# Patient Record
Sex: MALE | Race: WHITE | NOT HISPANIC OR LATINO | URBAN - METROPOLITAN AREA
[De-identification: names, ages, dates, MRNs, and addresses within clinical notes are randomized per-mention and may not be internally consistent; named-entity substitution may affect disease eponyms.]

---

## 2019-05-22 ENCOUNTER — OFFICE VISIT - HEALTHEAST (OUTPATIENT)
Dept: FAMILY MEDICINE | Facility: CLINIC | Age: 59
End: 2019-05-22

## 2019-05-22 DIAGNOSIS — M25.551 PAIN IN JOINT, PELVIC REGION AND THIGH, RIGHT: ICD-10-CM

## 2019-05-22 RX ORDER — LAMOTRIGINE 200 MG/1
200 TABLET ORAL
Status: SHIPPED | COMMUNITY
Start: 2019-04-22

## 2019-05-22 RX ORDER — VENLAFAXINE HYDROCHLORIDE 75 MG/1
CAPSULE, EXTENDED RELEASE ORAL
Status: SHIPPED | COMMUNITY
Start: 2019-04-22

## 2019-05-22 RX ORDER — RISPERIDONE 0.5 MG/1
0.5 TABLET ORAL
Status: SHIPPED | COMMUNITY
Start: 2019-04-22

## 2019-05-22 RX ORDER — TRAZODONE HYDROCHLORIDE 50 MG/1
100 TABLET, FILM COATED ORAL
Status: SHIPPED | COMMUNITY
Start: 2018-10-30

## 2019-05-22 RX ORDER — DIAZEPAM 5 MG
TABLET ORAL
Status: SHIPPED | COMMUNITY
Start: 2019-04-02

## 2019-05-22 ASSESSMENT — MIFFLIN-ST. JEOR: SCORE: 1766.08

## 2020-02-14 ENCOUNTER — COMMUNICATION - HEALTHEAST (OUTPATIENT)
Dept: UROLOGY | Facility: CLINIC | Age: 60
End: 2020-02-14

## 2021-05-27 ENCOUNTER — RECORDS - HEALTHEAST (OUTPATIENT)
Dept: ADMINISTRATIVE | Facility: CLINIC | Age: 61
End: 2021-05-27

## 2021-05-29 ENCOUNTER — RECORDS - HEALTHEAST (OUTPATIENT)
Dept: ADMINISTRATIVE | Facility: CLINIC | Age: 61
End: 2021-05-29

## 2021-05-29 NOTE — PROGRESS NOTES
"Chief Complaint   Patient presents with     Hospital Visit Follow Up     He states that he went to ER on Monday night. Originally had right testicle pain. That has subsided and now has right leg pain.     Leg Pain     Right leg pain. This started yesterday. Feels it from his hip to his knee. Constant pain. feels like a \"deep deep deep vicki horse\". Pain feels like a 10 constantly.      HPI: Patient presents today following up his evaluation in the emergency department on 5/21/2019.  He had awoken with severe right testicular pain which prompted his evaluation.  Ultrasound was done on the testicles which showed a small epididymis cyst, but no overtly abnormal findings.  CT of the abdomen and pelvis was done which showed diverticulosis and bilateral nonobstructing renal stones without hydronephrosis.    Labs included UA, lactic acid, lipase, hepatic function, inflammatory markers, metabolic panels, and blood counts.  All of those came back essentially normal.    He was discharged home with recommendations to take ibuprofen and follow-up with his PCP if symptoms fail to improve as anticipated.    Since discharge from the hospital, the patient notes that he is still having excruciating pain, but it is now evolved to where it is radiating down the anterior portion of his thigh.  He describes it as a severe aching sensation that will also sometimes radiate into the right testicle.  He did receive a prescription for Percocet and has taken 2 of those notes that there was mild-moderate improvement in pain.  He has not taken any today as he did not want to \"mask\" any of his symptoms.  He also was prescribed a Medrol Dosepak 2 days ago which he has not started.    No urinary symptoms. Afebrile, Denies saddle anesthesia.  No numbness or tingling down the right leg.  No bowel/bladder incontinence.  Able to ambulate.  Atraumatic.  He has been meeting with a chiropractor who said that his pain was related to a lumbar source.  He " "says that he has had imaging of his lower spine years ago, but cannot remember what it was exactly what the results were.    ROS:Review of Systems - History obtained from the patient  General ROS: negative  Hematological and Lymphatic ROS: negative  Respiratory ROS: negative  Cardiovascular ROS: negative  Gastrointestinal ROS: positive for - appetite loss  Genito-Urinary ROS: positive for - testicular pain  Musculoskeletal ROS: positive for - muscle pain  Neurological ROS: negative  Dermatological ROS: negative    SH: The Patient's  reports that he has quit smoking. He uses smokeless tobacco. He reports that he drank alcohol. He reports that he does not use drugs.      FH: The Patient's family history includes Abnormal EKG in his father; Breast cancer in his mother; Heart attack in his father; Pancreatic cancer in his mother; Parkinsonism in his father.     Meds:    Current Outpatient Medications on File Prior to Visit   Medication Sig Dispense Refill     diazePAM (VALIUM) 5 MG tablet Take by mouth.       lamoTRIgine (LAMICTAL) 200 MG tablet Take 200 mg by mouth.       risperiDONE (RISPERDAL) 0.5 MG tablet Take 0.5 mg by mouth.       traZODone (DESYREL) 50 MG tablet Take 100 mg by mouth.       venlafaxine (EFFEXOR-XR) 75 MG 24 hr capsule Take by mouth.       methylPREDNISolone (MEDROL, EVA,) 4 mg tablet Take by mouth as instructed per packaging.       No current facility-administered medications on file prior to visit.        O:  /90   Pulse 69   Temp 98.5  F (36.9  C) (Oral)   Ht 5' 11\" (1.803 m)   Wt 207 lb (93.9 kg)   SpO2 96%   BMI 28.87 kg/m      Physical Examination:   General appearance - alert, well appearing, and in no distress  Mental status - alert, oriented to person, place, and time  Mouth - mucous membranes moist, pharynx normal without lesions  Neck - supple, no significant adenopathy  Lymphatics - no palpable lymphadenopathy, no hepatosplenomegaly  Chest - clear to auscultation, no " wheezes, rales or rhonchi, symmetric air entry  Heart - normal rate and regular rhythm, S1 and S2 normal, no murmurs noted  Abdomen - soft, nontender, nondistended, no masses or organomegaly   Male - no penile lesions or discharge, no testicular masses or tenderness, no hernias, HERNIA EXAM: no hernias found on exam, TESTICULAR EXAM: normal, no masses, PENIS: normal without lesions or discharge, SCROTUM: normal, no masses  Neurological - alert, oriented, normal speech, no focal findings or movement disorder noted, neck supple without rigidity, cranial nerves II through XII intact, motor and sensory grossly normal bilaterally, normal muscle tone, no tremors, strength 5/5, DTRs intact  Musculoskeletal -no significant pain with palpation along the anterior thigh muscular system.  Flexion of the right hip causes a relief of pain in the thigh and testicle.  No pain with internal or external rotation.  Flexion of the left hip actually increases pain in the right testicle and thigh.  Extremities - peripheral pulses normal, no pedal edema, no clubbing or cyanosis  Skin - normal coloration and turgor, no rashes, no suspicious skin lesions noted      A/P:     Problem List Items Addressed This Visit     None      Visit Diagnoses     Pain in joint, pelvic region and thigh, right    -  Primary    Relevant Medications    oxyCODONE-acetaminophen (PERCOCET/ENDOCET) 5-325 mg per tablet    Other Relevant Orders    Ambulatory referral to Orthopedics            1. Pain in joint, pelvic region and thigh, right  Given the pain has now evolved to where it is traveling down the thigh and occasionally the testicle, I am more inclined to believe he may have a nerve root impingement.  Start with the Medrol Dosepak already prescribed to him.  Continue with as needed Percocet sparingly.  Advised no alcohol or driving afterwards.  Refer to orthopedics for further work-up for confirmation      - oxyCODONE-acetaminophen (PERCOCET/ENDOCET) 5-325  mg per tablet; Take 1 tablet by mouth every 6 (six) hours as needed for pain.  Dispense: 30 tablet; Refill: 0  - Ambulatory referral to Orthopedics        Wes Rivera, CNP

## 2021-05-29 NOTE — PATIENT INSTRUCTIONS - HE
I don't think the pain is related to the testicle itself, but rather an irritation of a nerve root. Start the medrol dose pack the other doctor gave and see if that helps. I also sent a temporary refill of the percocet. No alcohol or driving after taking this. Watch for lightheadedness, dizziness, and constipation.    Since the testicle ultrasound looks normal, but you're having that pain going down the thigh, let's start with orthopedics to dive deeper and see if they can identify the specific impingement point and come up with a treatment plan.    Thank you for coming in today!    If you receive a survey from [employer] about your experience today, it would be very helpful if you could fill it out to let us know what went well and what we can improve!    General Information:    Today you had your appointment with Wes Rivera NP    My hours are:    Monday : Out of clinic  Tuesday : 8:00AM - 5:00 PM  Wednesday: 8:00AM - 5:00 PM  Thursday: 8:00AM - 5:00 PM  Friday: 8:00AM - 5:00 PM    I am not in the office Mondays. Therefore non-urgent calls and medical messages received on Monday will be addressed when I am back in the office on Tuesday. Urgent matters will be reviewed and addressed by one of my partners in the office as needed.    If lab work was done today as part of your evaluation you will generally be contacted via [employer], mail, or phone with the results within 1-5 days. If there is an alarming result we will contact you by phone. Lab results come back at varying times, I generally wait until all lab results are available before making comments on the results.     If you need refills please contact your pharmacy. They will send a refill request to me to review. Please allow 3-5 business days for us to process all refill requests.     My Clinical Assistant is Steff. Please call us at [phone] or send a medical message with any questions or concerns.

## 2021-06-03 VITALS — BODY MASS INDEX: 28.98 KG/M2 | WEIGHT: 207 LBS | HEIGHT: 71 IN

## 2021-06-06 NOTE — TELEPHONE ENCOUNTER
Spoke with patient who is feeling well today.  He does not wish to schedule an appointment at this time.  Education given regarding medications and number to call with any questions or to schedule an appt.  Rachel Nielsen RN

## 2021-06-16 PROBLEM — K57.30 DIVERTICULOSIS OF LARGE INTESTINE: Status: ACTIVE | Noted: 2019-05-22

## 2025-06-03 NOTE — H&P (VIEW-ONLY)
PREOPERATIVE CLEARANCE  Date of Exam: 6/3/2025    Nursing Notes:   Masha Stroud CMA  6/3/2025  1:44 PM  Signed  Chief Complaint   Patient presents with     Preoperative Exam     DOS 6/24/25, Dr. Hopson, [facility] Maintenance Due   Topic Date Due     Pneumococcal series for age 50+ (1 of 2 - PCV) 08/04/1978     COVID-19 vaccine series (7 - 2024-25 season) 03/20/2025     Tetanus booster  05/07/2025       Roberto Ashton is a 64 y.o. male (1960) who presents for preop evaluation undergoing surgery for treatment of kidney stones, both kidneys.    Date of Surgery: 6/24/25  Surgical Specialty: Urology  Erick Hopson [facility] - [facility]  Surgery type: outpatient  Dr. Hopson FAX: [phone]  Primary Physician: Yenifer Quinteros CMA ....................  6/3/2025   1:35 PM       HPI:  Nephrolithiasis   No cp sob gi or neuro sx  Chronic depression currently stable     Problem List:   Patient Active Problem List   Diagnosis Code     Counseling for marital and partner problems, unspecified Z71.89, Z63.0     Lipids abnormal E78.89     Anxiety state, unspecified F41.1     Panic disorder without agoraphobia F41.0     Tobacco abuse Z72.0     Diverticulitis K57.92     Occupational circumstances Z56.9     Major depressive disorder, single episode, severe, without mention of psychotic behavior F32.2     Tinnitus of left ear H93.12     Sensorineural hearing loss, asymmetrical H90.3     Dysphagia R13.10     Eosinophilic esophagitis K20.0     Trauma disorder, cumulative X50.3XXA      Histories:  Past Medical History:   . Date     Anxiety      Depression      Diverticulitis      Eosinophilic esophagitis 4/30/2021     Tobacco abuse       Past Surgical History:   . Laterality Date     COLONOSCOPY SCREENING  11/15/2010    incomplete at [facility] d/t active diverticulitis;given abx;rep 6-8 wks     COLONOSCOPY SCREENING  10/17/2016     hemorrhoids;advanced adenoma - 2 polyps;tics;rep 1 yr per [facility]     ESOPHAGOGASTRODUODENOSCOPY  04/28/2021    +EoE bx;dil 14mm w/tear;gastritis;duodenitis;no HP or sprue     WISDOM TEETH EXTRACTION       Social History     Socioeconomic History     Marital status:    Tobacco Use     Smoking status: Every Day     Current packs/day: 0.50     Types: Cigarettes     Smokeless tobacco: Never     Tobacco comments:     vape   Vaping Use     Vaping status: Every Day     Substances: Nicotine   Substance and Sexual Activity     Alcohol use: No     Alcohol/week: 0.0 standard drinks of alcohol     Drug use: Yes     Types: Marijuana   Social History Narrative    Lives with wife in [place].     Family History   Problem Relation Age of Onset     Parkinsonism Father      Cancer-breast Mother      Cancer-pancreatic Mother       Allergies: Gatifloxacin [gatifloxacin] and Penicillins   Latex Allergy: no    Current Medications:  Current Outpatient Rx   Medication Sig Dispense Refill     ARIPiprazole 10 mg tablet Take 1 Tablet (10 mg) by mouth once daily. 30 Tablet 3     atorvastatin 20 mg tablet TAKE ONE TABLET BY MOUTH AT BEDTIME 90 Tablet 3     celecoxib 100 mg capsule Take 1 Capsule (100 mg) by mouth two times daily with meals. 30 Capsule 0     gabapentin 100 mg capsule Take 1 Capsule (100 mg) by mouth two times daily. Can stop after 2-4 weeks if no effect noticed. 90 Capsule 0     lamoTRIgine 200 mg tablet Take 1 Tablet (200 mg) by mouth once daily. 90 Tablet 1     oxyCODONE-acetaminophen (Percocet) 5-325 mg per tablet Take 1 Tablet by mouth every 4 hours if needed for Pain. Max acetaminophen dose: 4000mg in 24 hrs. 5 Tablet 0     sildenafiL (pulm.hypertension) 20 mg tablet TAKE 3-5 TABLETS BY MOUTH ONCE DAILY IF NEEDED BEFORE SEXUAL ACTIVITY 90 Tablet 5     tadalafiL (CIALIS) 5 mg tablet TAKE ONE TABLET BY MOUTH EVERY DAY 90 Tablet 3     tadalafiL (CIALIS;ADCIRCA) 20 mg tablet Take 1 Tablet (20 mg) by mouth once daily  "if needed for Erectile Dysfunction. Take 30 minutes before sexual activity. 10 Tablet 11     tamsulosin 0.4 mg capsule TAKE TWO CAPSULES BY MOUTH EVERY  Capsule 1     traZODone 100 mg tablet Take 1 Tablet (100 mg) by mouth at bedtime. 30 Tablet 3     venlafaxine 150 mg Extended-Release capsule Take 2 Capsules (300 mg) by mouth once daily with evening meal. 60 Capsule 3             HABITS:   Social History     Tobacco Use     Smoking status: Every Day     Current packs/day: 0.50     Types: Cigarettes     Smokeless tobacco: Never     Tobacco comments:     vape   Substance Use Topics     Alcohol use: No     Alcohol/week: 0.0 standard drinks of alcohol   Tetanus up to date yes        Health Care Directive or Living Will: no    REVIEW OF SYSTEMS:  see HPI    EXAM:   /64 (Cuff Site: Right Arm, Position: Sitting, Cuff Size: Adult Regular)   Pulse 72   Ht 1.753 m (5' 9\")   Wt 69.9 kg (154 lb)   SpO2 97%   BMI 22.74 kg/m   Body mass index is 22.74 kg/m .  HEENT   neg   NECK    supple  LN'S    no cervical or supraclavicular lymphadenopathy  ENDO    no thyromegaly  PULM    clear   CV      Regular rate no m or g  GI      abd soft no r or g  NEURO   grossly non focal  SKIN    no obvious or diffuse rash  JOINTS  no obvious deformities  EXT     no edema      DIAGNOSTICS:       3. Labs: see attached    IMPRESSION:     ICD-10-CM    1. Preop examination  Z01.818 CBC AND DIFFERENTIAL     BASIC METABOLIC PANEL      2. Back pain without radiation  M54.9 celecoxib 100 mg capsule     oxyCODONE-acetaminophen (Percocet) 5-325 mg per tablet      3. Nephrolithiasis  N20.0           For above listed surgery and anesthesia:   Patient is low risk for perioperative complications.    RECOMMENDATIONS: proceed without further diagnostic evaluation    Electronically Signed by: Yenifer Quinteros MD          "

## 2025-06-20 RX ORDER — ATORVASTATIN CALCIUM 20 MG/1
20 TABLET, FILM COATED ORAL DAILY
COMMUNITY

## 2025-06-20 RX ORDER — ARIPIPRAZOLE 10 MG/1
10 TABLET ORAL DAILY
COMMUNITY

## 2025-06-20 RX ORDER — TAMSULOSIN HYDROCHLORIDE 0.4 MG/1
0.8 CAPSULE ORAL DAILY
COMMUNITY

## 2025-06-20 RX ORDER — CELECOXIB 100 MG/1
100 CAPSULE ORAL 2 TIMES DAILY
COMMUNITY

## 2025-06-20 RX ORDER — TADALAFIL 20 MG/1
20 TABLET ORAL EVERY 24 HOURS
COMMUNITY

## 2025-06-20 RX ORDER — GABAPENTIN 100 MG/1
100 CAPSULE ORAL 2 TIMES DAILY
COMMUNITY

## 2025-06-20 RX ORDER — OXYCODONE AND ACETAMINOPHEN 5; 325 MG/1; MG/1
1 TABLET ORAL EVERY 4 HOURS PRN
COMMUNITY

## 2025-06-20 RX ORDER — TADALAFIL 5 MG/1
5 TABLET ORAL EVERY 24 HOURS
COMMUNITY

## 2025-06-23 ENCOUNTER — ANESTHESIA EVENT (OUTPATIENT)
Dept: SURGERY | Facility: HOSPITAL | Age: 65
End: 2025-06-23
Payer: COMMERCIAL

## 2025-06-24 ENCOUNTER — ANESTHESIA (OUTPATIENT)
Dept: SURGERY | Facility: HOSPITAL | Age: 65
End: 2025-06-24
Payer: COMMERCIAL

## 2025-06-24 ENCOUNTER — APPOINTMENT (OUTPATIENT)
Dept: RADIOLOGY | Facility: HOSPITAL | Age: 65
End: 2025-06-24
Attending: UROLOGY
Payer: COMMERCIAL

## 2025-06-24 ENCOUNTER — HOSPITAL ENCOUNTER (OUTPATIENT)
Facility: HOSPITAL | Age: 65
Discharge: HOME | End: 2025-06-24
Attending: UROLOGY | Admitting: UROLOGY
Payer: COMMERCIAL

## 2025-06-24 VITALS
BODY MASS INDEX: 23.16 KG/M2 | RESPIRATION RATE: 16 BRPM | DIASTOLIC BLOOD PRESSURE: 73 MMHG | WEIGHT: 156.8 LBS | SYSTOLIC BLOOD PRESSURE: 136 MMHG | TEMPERATURE: 98.3 F | OXYGEN SATURATION: 99 % | HEART RATE: 65 BPM

## 2025-06-24 DIAGNOSIS — N20.0 KIDNEY STONE: Primary | ICD-10-CM

## 2025-06-24 PROCEDURE — C2617 STENT, NON-COR, TEM W/O DEL: HCPCS | Performed by: UROLOGY

## 2025-06-24 PROCEDURE — 250N000011 HC RX IP 250 OP 636: Performed by: NURSE ANESTHETIST, CERTIFIED REGISTERED

## 2025-06-24 PROCEDURE — 360N000084 HC SURGERY LEVEL 4 W/ FLUORO, PER MIN: Performed by: UROLOGY

## 2025-06-24 PROCEDURE — 710N000009 HC RECOVERY PHASE 1, LEVEL 1, PER MIN: Performed by: UROLOGY

## 2025-06-24 PROCEDURE — 258N000003 HC RX IP 258 OP 636: Performed by: NURSE ANESTHETIST, CERTIFIED REGISTERED

## 2025-06-24 PROCEDURE — P9045 ALBUMIN (HUMAN), 5%, 250 ML: HCPCS | Mod: JZ | Performed by: NURSE ANESTHETIST, CERTIFIED REGISTERED

## 2025-06-24 PROCEDURE — 258N000001 HC RX 258: Performed by: UROLOGY

## 2025-06-24 PROCEDURE — 250N000009 HC RX 250: Performed by: ANESTHESIOLOGY

## 2025-06-24 PROCEDURE — 999N000141 HC STATISTIC PRE-PROCEDURE NURSING ASSESSMENT: Performed by: UROLOGY

## 2025-06-24 PROCEDURE — 250N000011 HC RX IP 250 OP 636: Performed by: NURSE PRACTITIONER

## 2025-06-24 PROCEDURE — 250N000025 HC SEVOFLURANE, PER MIN: Performed by: UROLOGY

## 2025-06-24 PROCEDURE — 999N000182 XR SURGERY CARM FLUORO GREATER THAN 5 MIN

## 2025-06-24 PROCEDURE — C1769 GUIDE WIRE: HCPCS | Performed by: UROLOGY

## 2025-06-24 PROCEDURE — 272N000002 HC OR SUPPLY OTHER OPNP: Performed by: UROLOGY

## 2025-06-24 PROCEDURE — 710N000012 HC RECOVERY PHASE 2, PER MINUTE: Performed by: UROLOGY

## 2025-06-24 PROCEDURE — 272N000001 HC OR GENERAL SUPPLY STERILE: Performed by: UROLOGY

## 2025-06-24 PROCEDURE — C1894 INTRO/SHEATH, NON-LASER: HCPCS | Performed by: UROLOGY

## 2025-06-24 PROCEDURE — 250N000013 HC RX MED GY IP 250 OP 250 PS 637: Performed by: NURSE PRACTITIONER

## 2025-06-24 PROCEDURE — 250N000009 HC RX 250: Performed by: NURSE ANESTHETIST, CERTIFIED REGISTERED

## 2025-06-24 PROCEDURE — 250N000011 HC RX IP 250 OP 636: Performed by: ANESTHESIOLOGY

## 2025-06-24 PROCEDURE — 258N000003 HC RX IP 258 OP 636: Performed by: ANESTHESIOLOGY

## 2025-06-24 PROCEDURE — 82365 CALCULUS SPECTROSCOPY: CPT | Performed by: UROLOGY

## 2025-06-24 PROCEDURE — 370N000017 HC ANESTHESIA TECHNICAL FEE, PER MIN: Performed by: UROLOGY

## 2025-06-24 PROCEDURE — 255N000002 HC RX 255 OP 636: Performed by: UROLOGY

## 2025-06-24 DEVICE — URETERAL STENT
Type: IMPLANTABLE DEVICE | Site: URETER | Status: FUNCTIONAL
Brand: PERCUFLEX™ PLUS

## 2025-06-24 RX ORDER — LIDOCAINE 40 MG/G
CREAM TOPICAL
Status: DISCONTINUED | OUTPATIENT
Start: 2025-06-24 | End: 2025-06-24 | Stop reason: HOSPADM

## 2025-06-24 RX ORDER — OXYCODONE HYDROCHLORIDE 5 MG/1
5 TABLET ORAL
Status: DISCONTINUED | OUTPATIENT
Start: 2025-06-24 | End: 2025-06-24 | Stop reason: HOSPADM

## 2025-06-24 RX ORDER — CEFAZOLIN SODIUM/WATER 2 G/20 ML
2 SYRINGE (ML) INTRAVENOUS
Status: COMPLETED | OUTPATIENT
Start: 2025-06-24 | End: 2025-06-24

## 2025-06-24 RX ORDER — FENTANYL CITRATE 50 UG/ML
25 INJECTION, SOLUTION INTRAMUSCULAR; INTRAVENOUS EVERY 5 MIN PRN
Status: DISCONTINUED | OUTPATIENT
Start: 2025-06-24 | End: 2025-06-24 | Stop reason: HOSPADM

## 2025-06-24 RX ORDER — SODIUM CHLORIDE, SODIUM LACTATE, POTASSIUM CHLORIDE, CALCIUM CHLORIDE 600; 310; 30; 20 MG/100ML; MG/100ML; MG/100ML; MG/100ML
INJECTION, SOLUTION INTRAVENOUS CONTINUOUS
Status: DISCONTINUED | OUTPATIENT
Start: 2025-06-24 | End: 2025-06-24 | Stop reason: HOSPADM

## 2025-06-24 RX ORDER — DEXAMETHASONE SODIUM PHOSPHATE 4 MG/ML
4 INJECTION, SOLUTION INTRA-ARTICULAR; INTRALESIONAL; INTRAMUSCULAR; INTRAVENOUS; SOFT TISSUE
Status: DISCONTINUED | OUTPATIENT
Start: 2025-06-24 | End: 2025-06-24 | Stop reason: HOSPADM

## 2025-06-24 RX ORDER — ONDANSETRON 2 MG/ML
INJECTION INTRAMUSCULAR; INTRAVENOUS PRN
Status: DISCONTINUED | OUTPATIENT
Start: 2025-06-24 | End: 2025-06-24

## 2025-06-24 RX ORDER — HYDROCODONE BITARTRATE AND ACETAMINOPHEN 5; 325 MG/1; MG/1
1-2 TABLET ORAL EVERY 4 HOURS PRN
Qty: 10 TABLET | Refills: 0 | Status: SHIPPED | OUTPATIENT
Start: 2025-06-24

## 2025-06-24 RX ORDER — NALOXONE HYDROCHLORIDE 1 MG/ML
0.1 INJECTION INTRAMUSCULAR; INTRAVENOUS; SUBCUTANEOUS
Status: DISCONTINUED | OUTPATIENT
Start: 2025-06-24 | End: 2025-06-24 | Stop reason: HOSPADM

## 2025-06-24 RX ORDER — OXYCODONE HYDROCHLORIDE 5 MG/1
10 TABLET ORAL
Status: DISCONTINUED | OUTPATIENT
Start: 2025-06-24 | End: 2025-06-24 | Stop reason: HOSPADM

## 2025-06-24 RX ORDER — OXYBUTYNIN CHLORIDE 5 MG/1
5 TABLET ORAL 3 TIMES DAILY PRN
Qty: 30 TABLET | Refills: 1 | Status: SHIPPED | OUTPATIENT
Start: 2025-06-24

## 2025-06-24 RX ORDER — FENTANYL CITRATE 50 UG/ML
INJECTION, SOLUTION INTRAMUSCULAR; INTRAVENOUS PRN
Status: DISCONTINUED | OUTPATIENT
Start: 2025-06-24 | End: 2025-06-24

## 2025-06-24 RX ORDER — DEXAMETHASONE SODIUM PHOSPHATE 10 MG/ML
INJECTION, SOLUTION INTRAMUSCULAR; INTRAVENOUS PRN
Status: DISCONTINUED | OUTPATIENT
Start: 2025-06-24 | End: 2025-06-24

## 2025-06-24 RX ORDER — CEFAZOLIN SODIUM/WATER 2 G/20 ML
2 SYRINGE (ML) INTRAVENOUS SEE ADMIN INSTRUCTIONS
Status: DISCONTINUED | OUTPATIENT
Start: 2025-06-24 | End: 2025-06-24 | Stop reason: HOSPADM

## 2025-06-24 RX ORDER — ONDANSETRON 4 MG/1
4 TABLET, ORALLY DISINTEGRATING ORAL EVERY 30 MIN PRN
Status: DISCONTINUED | OUTPATIENT
Start: 2025-06-24 | End: 2025-06-24 | Stop reason: HOSPADM

## 2025-06-24 RX ORDER — PROPOFOL 10 MG/ML
INJECTION, EMULSION INTRAVENOUS PRN
Status: DISCONTINUED | OUTPATIENT
Start: 2025-06-24 | End: 2025-06-24

## 2025-06-24 RX ORDER — ACETAMINOPHEN 650 MG/1
650 SUPPOSITORY RECTAL ONCE
Status: COMPLETED | OUTPATIENT
Start: 2025-06-24 | End: 2025-06-24

## 2025-06-24 RX ORDER — ONDANSETRON 2 MG/ML
4 INJECTION INTRAMUSCULAR; INTRAVENOUS EVERY 30 MIN PRN
Status: DISCONTINUED | OUTPATIENT
Start: 2025-06-24 | End: 2025-06-24 | Stop reason: HOSPADM

## 2025-06-24 RX ORDER — HYDROCODONE BITARTRATE AND ACETAMINOPHEN 5; 325 MG/1; MG/1
1 TABLET ORAL EVERY 4 HOURS PRN
Status: DISCONTINUED | OUTPATIENT
Start: 2025-06-24 | End: 2025-06-24 | Stop reason: HOSPADM

## 2025-06-24 RX ORDER — ACETAMINOPHEN 325 MG/1
650 TABLET ORAL EVERY 4 HOURS PRN
Status: DISCONTINUED | OUTPATIENT
Start: 2025-06-24 | End: 2025-06-24 | Stop reason: HOSPADM

## 2025-06-24 RX ORDER — ACETAMINOPHEN 325 MG/1
975 TABLET ORAL ONCE
Status: COMPLETED | OUTPATIENT
Start: 2025-06-24 | End: 2025-06-24

## 2025-06-24 RX ORDER — FENTANYL CITRATE 50 UG/ML
50 INJECTION, SOLUTION INTRAMUSCULAR; INTRAVENOUS EVERY 5 MIN PRN
Status: DISCONTINUED | OUTPATIENT
Start: 2025-06-24 | End: 2025-06-24 | Stop reason: HOSPADM

## 2025-06-24 RX ORDER — EPHEDRINE SULFATE 50 MG/ML
INJECTION, SOLUTION INTRAMUSCULAR; INTRAVENOUS; SUBCUTANEOUS PRN
Status: DISCONTINUED | OUTPATIENT
Start: 2025-06-24 | End: 2025-06-24

## 2025-06-24 RX ADMIN — SODIUM CHLORIDE 20 MCG: 9 INJECTION, SOLUTION INTRAVENOUS at 09:53

## 2025-06-24 RX ADMIN — PHENYLEPHRINE HYDROCHLORIDE 100 MCG: 10 INJECTION INTRAVENOUS at 07:40

## 2025-06-24 RX ADMIN — ACETAMINOPHEN 975 MG: 325 TABLET ORAL at 06:40

## 2025-06-24 RX ADMIN — ROCURONIUM 50 MG: 50 INJECTION, SOLUTION INTRAVENOUS at 07:22

## 2025-06-24 RX ADMIN — ALBUMIN HUMAN: 0.05 INJECTION, SOLUTION INTRAVENOUS at 07:45

## 2025-06-24 RX ADMIN — PHENYLEPHRINE HYDROCHLORIDE 150 MCG: 10 INJECTION INTRAVENOUS at 07:24

## 2025-06-24 RX ADMIN — PROPOFOL 100 MG: 10 INJECTION, EMULSION INTRAVENOUS at 08:07

## 2025-06-24 RX ADMIN — PHENYLEPHRINE HYDROCHLORIDE 100 MCG: 10 INJECTION INTRAVENOUS at 07:43

## 2025-06-24 RX ADMIN — ROCURONIUM 10 MG: 50 INJECTION, SOLUTION INTRAVENOUS at 09:33

## 2025-06-24 RX ADMIN — PHENYLEPHRINE HYDROCHLORIDE 100 MCG: 10 INJECTION INTRAVENOUS at 08:06

## 2025-06-24 RX ADMIN — Medication 10 MG: at 08:12

## 2025-06-24 RX ADMIN — SODIUM CHLORIDE 20 MCG: 9 INJECTION, SOLUTION INTRAVENOUS at 08:41

## 2025-06-24 RX ADMIN — ROCURONIUM 10 MG: 50 INJECTION, SOLUTION INTRAVENOUS at 09:10

## 2025-06-24 RX ADMIN — PHENYLEPHRINE HYDROCHLORIDE 100 MCG: 10 INJECTION INTRAVENOUS at 07:42

## 2025-06-24 RX ADMIN — FENTANYL CITRATE 25 MCG: 50 INJECTION, SOLUTION INTRAMUSCULAR; INTRAVENOUS at 10:39

## 2025-06-24 RX ADMIN — PHENYLEPHRINE HYDROCHLORIDE 100 MCG: 10 INJECTION INTRAVENOUS at 07:55

## 2025-06-24 RX ADMIN — PHENYLEPHRINE HYDROCHLORIDE 100 MCG: 10 INJECTION INTRAVENOUS at 08:07

## 2025-06-24 RX ADMIN — LIDOCAINE HYDROCHLORIDE 50 MG: 10 INJECTION, SOLUTION INFILTRATION; PERINEURAL at 07:21

## 2025-06-24 RX ADMIN — FENTANYL CITRATE 100 MCG: 50 INJECTION, SOLUTION INTRAMUSCULAR; INTRAVENOUS at 07:20

## 2025-06-24 RX ADMIN — PROPOFOL 130 MG: 10 INJECTION, EMULSION INTRAVENOUS at 08:06

## 2025-06-24 RX ADMIN — ONDANSETRON 4 MG: 2 INJECTION INTRAMUSCULAR; INTRAVENOUS at 09:46

## 2025-06-24 RX ADMIN — DEXAMETHASONE SODIUM PHOSPHATE 10 MG: 10 INJECTION, SOLUTION INTRAMUSCULAR; INTRAVENOUS at 07:31

## 2025-06-24 RX ADMIN — ROCURONIUM 10 MG: 50 INJECTION, SOLUTION INTRAVENOUS at 08:32

## 2025-06-24 RX ADMIN — Medication 2 G: at 07:11

## 2025-06-24 RX ADMIN — SODIUM CHLORIDE, SODIUM LACTATE, POTASSIUM CHLORIDE, AND CALCIUM CHLORIDE: .6; .31; .03; .02 INJECTION, SOLUTION INTRAVENOUS at 06:10

## 2025-06-24 RX ADMIN — PHENYLEPHRINE HYDROCHLORIDE 100 MCG: 10 INJECTION INTRAVENOUS at 07:46

## 2025-06-24 RX ADMIN — Medication 5 MG: at 08:22

## 2025-06-24 RX ADMIN — PHENYLEPHRINE HYDROCHLORIDE 0.3 MCG/KG/MIN: 10 INJECTION INTRAVENOUS at 08:07

## 2025-06-24 RX ADMIN — PHENYLEPHRINE HYDROCHLORIDE 200 MCG: 10 INJECTION INTRAVENOUS at 08:10

## 2025-06-24 RX ADMIN — MIDAZOLAM HYDROCHLORIDE 2 MG: 1 INJECTION, SOLUTION INTRAMUSCULAR; INTRAVENOUS at 07:11

## 2025-06-24 RX ADMIN — Medication 5 MG: at 08:27

## 2025-06-24 RX ADMIN — SUGAMMADEX 280 MG: 100 INJECTION, SOLUTION INTRAVENOUS at 09:49

## 2025-06-24 RX ADMIN — Medication 5 MG: at 08:25

## 2025-06-24 RX ADMIN — FENTANYL CITRATE 25 MCG: 50 INJECTION, SOLUTION INTRAMUSCULAR; INTRAVENOUS at 10:25

## 2025-06-24 RX ADMIN — PROPOFOL 170 MG: 10 INJECTION, EMULSION INTRAVENOUS at 07:21

## 2025-06-24 RX ADMIN — SODIUM CHLORIDE, SODIUM LACTATE, POTASSIUM CHLORIDE, AND CALCIUM CHLORIDE: .6; .31; .03; .02 INJECTION, SOLUTION INTRAVENOUS at 07:58

## 2025-06-24 ASSESSMENT — ACTIVITIES OF DAILY LIVING (ADL)
ADLS_ACUITY_SCORE: 18
ADLS_ACUITY_SCORE: 22
ADLS_ACUITY_SCORE: 18
ADLS_ACUITY_SCORE: 18
ADLS_ACUITY_SCORE: 22
ADLS_ACUITY_SCORE: 18
ADLS_ACUITY_SCORE: 18

## 2025-06-24 NOTE — OP NOTE
Location: [facility]     Patient Name: Roberto Ashton  Patient : 1960  Patient MRN: [id number]  Patient CSN: [id number]  Patient PCP: Yenifer Quinteros    Date of Service: 25     Pre-operative diagnosis: Bilateral kidney stones    Post-operative diagnosis: Bilateral kidney stones    Procedure:  Cystoscopy, Bilateral retrograde pyelogram, Bilateral ureteroscopy with laser lithotripsy, Bilateral 6 Fr x 28 cm ureteral stent placement    Surgeon: Dr. Erick Hopson    EBL: 10 mL    Anesthesia: General    Indication: 64 year old male who saw me for BPH concerns and had a renal ultrasound suggestive of a left kidney stone. CT imaging showed bilateral kidney stones (4 in each side; 3 mm right upper pole, 4/5/6 mm right lower pole stones, 3 mm left upper pole stone, 3 mm left mid renal stone, 7/4 mm left lower pole stones).  Options were discussed for stone management and patient elected to have them removed.  Risks, benefits, and alternatives to treatment were discussed with the patient and the patient elected to proceed.    Findings: His urethra was normal. Prostate was obstructive with bilobar hyperplasia. He had an elevated bladder neck. Bladder trabeculation was mild to moderate. There were bilateral ureteroceles with an os the size of the sensor wire.  On the left side, there was a diverticulum superior and lateral to the left ureterocele.  There was not hydronephrosis of either kidney.  On the left side, the only stones seen were in the lower pole and they were under the surface.  I lasered them free and removed 2 stones (~4 mm and 2 mm).  On the right side, there was a 3 mm upper pole stone that I lasered from the submucosa.  In the right lower pole, there was a cluster of stones that were submucosal as well.  Removing these stones took considerable time as reaching them with the ureteroscope was difficult due to the anatomy. A lot of secondary deflection was required.  However, I was able to clear out  this cluster of stones and removed a 5 mm, 4 mm, and 2 mm stone. No other right kidney stones were seen.  Bilateral ureteral stents were in good position.    Specimens: Left kidney stones, right kidney stones    Procedure:  After written informed consent was obtained the patient was brought into the operating room.  The patient was properly identified prior to the procedure, received preprocedure antibiotics, and had sequential compression devices on the legs.  The patient was then induced under anesthesia.    The patient was placed in dorsal lithotomy position and prepped and draped in the usual sterile fashion.  Cystoscopy was performed with the above findings.    The left diverticulum was cannulated with the 5 Fr open-ended ureteral catheter as it initially appeared to be the ureteral orifice.  Contrast was injected and it was found to be a diverticulum.      An angle tip sensor wire was passed through the left ureterocele and into the distal ureter.  A 5 Fr open-ended ureteral catheter was advanced into the distal ureter and the wire was removed.  A Left retrograde pyelogram was performed with the above findings.    A straight sensor wire was placed into the Left kidney. The 8/10 Fr coaxial dilators were then used to place a Super Stiff wire into the Left kidney.  Over the Super Stiff wire and under fluoroscopic guidance, a 11/13 Fr x 36 cm [employer] ureteral access sheath was placed. This was sutured in place using 2-0 Silk.  Flexible ureteroscopy was performed as above. Using the 200 nm laser fiber and the holmium laser, the stones in the lower pole were freed from the submucosal space.  I tried to remove the stones with the zero tip basket but the basket was not flexible enough to reach the stones.  The stones were removed easily with the [place] Halo basket. The access sheath was removed under direct vision.  The straight sensor wire was exchanged for the Super Stiff wire. A 6 Fr x 28 cm  ureteral stent was then deployed over the Super Stiff wire. There was a good coil in the kidney and a good coil in the bladder.  I verified the distal coil via cystoscopy.    An angle tip sensor wire was passed through the right ureterocele and into the distal ureter.  A 5 Fr open-ended ureteral catheter was advanced into the distal ureter and the wire was removed.  A Right retrograde pyelogram was performed with the above findings.    A straight sensor wire was placed into the Right kidney. The 8/10 Fr coaxial dilators were then used to place a Super Stiff wire into the Right kidney.  Over the Super Stiff wire and under fluoroscopic guidance, a 11/13 Fr x 36 cm [employer] ureteral access sheath was placed. This was sutured in place using 2-0 Silk.  Flexible ureteroscopy was performed as above. The upper pole stone and lower pole stones were freed from the submucosal space using the 200 nm laser fiber and holmium laser.  The [place] Halo basket was used to remove stones as above. Treatment of the lower pole took at least 30 minutes given the difficult anatomy, stone location, and need for secondary deflection. The access sheath was removed under direct vision.  The straight sensor wire was exchanged for the Super Stiff wire. A 6 Fr x 28 cm ureteral stent was then deployed over the Super Stiff wire. There was a good coil in the kidney and a good coil in the bladder.  I verified the distal coil via cystoscopy. I emptied the bladder and removed the scope. At this point, the procedure was completed.  He tolerated the procedure well.    Plan: Home. Stent removal in office on 7/2/25.  Remaining stones seen on CT scan are deeper submucosal or intraparenchymal at this time.    Erick Hopson MD  9:51 AM

## 2025-06-24 NOTE — ANESTHESIA PREPROCEDURE EVALUATION
Anesthesia Pre-Procedure Evaluation    Patient: Roberto Ashton   MRN: [id number] : 1960          Procedure : Procedure(s):  CYSTOSCOPY, BILATERAL RETROGRADE PYELOGRAM, BILATERAL URETEROSCOPY WITH LASER LITHOTRIPSY, BILATERAL URETERAL STENT PLACEMENT         Past Medical History:   Diagnosis Date    Anxiety state 2013    Diverticulosis of large intestine 2019    Dysphagia     Lipids abnormal 2012    Major depressive disorder, single episode, severe (H) 2014    Nephrolithiasis     Panic disorder without agoraphobia 2013      Past Surgical History:   Procedure Laterality Date    COLONOSCOPY      GI SURGERY      EGD    WISDOM TOOTH EXTRACTION        Allergies   Allergen Reactions    Penicillins Rash     Rash all over     Gatifloxacin Rash      Social History     Tobacco Use    Smoking status: Every Day     Types: Cigarettes    Smokeless tobacco: Current    Tobacco comments:     Vapes, 1/2 a cartridge per day, smokes 1/2 pack cigarettes per day   Substance Use Topics    Alcohol use: Not Currently      Wt Readings from Last 1 Encounters:   25 71.1 kg (156 lb 12.8 oz)        Anesthesia Evaluation   Pt has had prior anesthetic.         ROS/MED HX  ENT/Pulmonary:       Neurologic:       Cardiovascular:       METS/Exercise Tolerance:     Hematologic:       Musculoskeletal:       GI/Hepatic:       Renal/Genitourinary:     (+) renal disease,             Endo:       Psychiatric/Substance Use:     (+) psychiatric history anxiety and depression       Infectious Disease:       Malignancy:       Other:              Physical Exam  Airway  Mallampati: III  TM distance: >3 FB  Neck ROM: full  Mouth opening: < 4 cm    Cardiovascular   Rhythm: regular  Rate: normal rate     Dental   (+) Completely normal teeth      Pulmonary Breath sounds clear to auscultation        Neurological   He appears awake, alert and oriented x3.    Other Findings       OUTSIDE LABS:  CBC:   Lab Results   Component Value  "Date    WBC 7.5 02/14/2020    WBC 8.4 05/21/2019    HGB 15.3 02/14/2020    HGB 14.8 05/21/2019    HCT 44.4 02/14/2020    HCT 42.4 05/21/2019     02/14/2020     05/21/2019     BMP:   Lab Results   Component Value Date     02/14/2020     05/21/2019    POTASSIUM 4.1 02/14/2020    POTASSIUM 3.7 05/21/2019    CHLORIDE 105 02/14/2020    CHLORIDE 105 05/21/2019    CO2 26 02/14/2020    CO2 25 05/21/2019    BUN 16 02/14/2020    BUN 18 05/21/2019    CR 1.05 02/14/2020    CR 0.91 05/21/2019     02/14/2020     (H) 05/21/2019     COAGS: No results found for: \"PTT\", \"INR\", \"FIBR\"  POC: No results found for: \"BGM\", \"HCG\", \"HCGS\"  HEPATIC:   Lab Results   Component Value Date    ALBUMIN 3.8 05/21/2019    PROTTOTAL 6.7 05/21/2019    ALT 19 05/21/2019    AST 16 05/21/2019    ALKPHOS 73 05/21/2019    BILITOTAL 0.3 05/21/2019     OTHER:   Lab Results   Component Value Date    LACT 1.2 05/21/2019    SHAY 9.8 02/14/2020    LIPASE 28 05/21/2019    CRP 0.2 02/14/2020       Anesthesia Plan    ASA Status:  2      NPO Status: NPO Appropriate   Anesthesia Type: General.  Airway: oral.  Induction: intravenous.  Maintenance: Inhalation.   Techniques and Equipment:       - Monitoring Plan: standard ASA monitoring     Consents    Anesthesia Plan(s) and associated risks, benefits, and realistic alternatives discussed. Questions answered and patient/representative(s) expressed understanding.     - Discussed: CRNA     - Discussed with:  Patient               Postoperative Care    Pain management: non-narcotic analgesics, plan for postoperative opioid use.     Comments:    Other Comments: RBA GETA discussed including but not limited to dental damage, sore throat/hoarseness, N/V/Aspiration, MI/CVA/death/disability, positioning injuries, blood loss requiring transfusion                 Eneida Maya MD    I have reviewed the pertinent notes and labs in the chart from the past 30 days and (re)examined the " patient.  Any updates or changes from those notes are reflected in this note.    Clinically Significant Risk Factors Present on Admission

## 2025-06-24 NOTE — ANESTHESIA PROCEDURE NOTES
Airway       Patient location during procedure: OR       Procedure Start/Stop Times: 6/24/2025 7:25 AM  Staff -        CRNA: Payal Mclaughlin APRN CRNA       Performed By: CRNA  Consent for Airway        Urgency: elective  Indications and Patient Condition       Indications for airway management: jeanna-procedural       Induction type:intravenous       Mask difficulty assessment: 2 - vent by mask + OA or adjuvant +/- NMBA    Final Airway Details       Final airway type: endotracheal airway       Successful airway: ETT - single and Oral  Endotracheal Airway Details        ETT size (mm): 7.5       Cuffed: yes       Cuff volume (mL): 5       Successful intubation technique: direct laryngoscopy       DL Blade Type: Torre 2       Grade View of Cords: 1       Adjucts: stylet       Position: Right       Measured from: gums/teeth       Secured at (cm): 23       Bite block used: None    Post intubation assessment        Placement verified by: capnometry, equal breath sounds and chest rise        Number of attempts at approach: 1       Number of other approaches attempted: 0       Secured with: tape       Ease of procedure: easy       Dentition: Intact and Unchanged       Dental guard used and removed. Dental Guard Type: Standard White.    Medication(s) Administered   Medication Administration Time: 6/24/2025 7:25 AM

## 2025-06-24 NOTE — ANESTHESIA CARE TRANSFER NOTE
Patient: Roberto Ashton    Procedure: Procedure(s):  CYSTOSCOPY, BILATERAL RETROGRADE PYELOGRAM, BILATERAL URETEROSCOPY WITH LASER LITHOTRIPSY, BILATERAL URETERAL STENT PLACEMENT       Diagnosis: Calculus of kidney [N20.0]  Diagnosis Additional Information: No value filed.    Anesthesia Type:   General     Note:    Oropharynx: oropharynx clear of all foreign objects and spontaneously breathing  Level of Consciousness: drowsy  Oxygen Supplementation: face mask  Level of Supplemental Oxygen (L/min / FiO2): 8  Independent Airway: airway patency satisfactory and stable  Dentition: dentition unchanged  Vital Signs Stable: post-procedure vital signs reviewed and stable  Report to RN Given: handoff report given  Patient transferred to: PACU  Comments: Patient settled down (very restless after extubation.)  Handoff Report: Identifed the Patient, Identified the Reponsible Provider, Reviewed the pertinent medical history, Discussed the surgical course, Reviewed Intra-OP anesthesia mangement and issues during anesthesia, Set expectations for post-procedure period and Allowed opportunity for questions and acknowledgement of understanding  Vitals:  Vitals Value Taken Time   /67 06/24/25 10:01   Temp     Pulse 70 06/24/25 10:05   Resp 18 06/24/25 10:05   SpO2 100 % 06/24/25 10:05   Vitals shown include unfiled device data.    Electronically Signed By: ADILENE Evans CRNA  June 24, 2025  10:07 AM

## 2025-06-24 NOTE — ANESTHESIA POSTPROCEDURE EVALUATION
Patient: Roberto Ashton    Procedure: Procedure(s):  CYSTOSCOPY, BILATERAL RETROGRADE PYELOGRAM, BILATERAL URETEROSCOPY WITH LASER LITHOTRIPSY, BILATERAL URETERAL STENT PLACEMENT       Anesthesia Type:  General    Note:  Disposition: Outpatient   Postop Pain Control: Uneventful            Sign Out: Well controlled pain   PONV: No   Neuro/Psych: Uneventful            Sign Out: Acceptable/Baseline neuro status   Airway/Respiratory: Uneventful            Sign Out: Acceptable/Baseline resp. status   CV/Hemodynamics: Uneventful            Sign Out: Acceptable CV status; No obvious hypovolemia; No obvious fluid overload   Other NRE: NONE   DID A NON-ROUTINE EVENT OCCUR? No           Last vitals:  Vitals Value Taken Time   /64 06/24/25 10:30   Temp 36.7  C (98  F) 06/24/25 10:15   Pulse 68 06/24/25 10:33   Resp 14 06/24/25 10:33   SpO2 96 % 06/24/25 10:33   Vitals shown include unfiled device data.    Electronically Signed By: Eneida Maya MD  June 24, 2025  10:35 AM

## 2025-06-27 LAB
APPEARANCE STONE: NORMAL
APPEARANCE STONE: NORMAL
COMPN STONE: NORMAL
COMPN STONE: NORMAL
SPECIMEN WT: 15 MG
SPECIMEN WT: 74 MG

## (undated) DEVICE — GOWN XLG DISP 9545

## (undated) DEVICE — STRAP PSTN 60X4IN BD KN NCDTV REUSE KBS 02

## (undated) DEVICE — GUIDEWIRE SENSOR DUAL FLEX STR 0.035"X150CM M0066703080

## (undated) DEVICE — GLOVE BIOGEL PI INDICATOR 8.0 LF 41680

## (undated) DEVICE — CUSTOM PACK CYSTO PREFERRED SOT5BCYHEA

## (undated) DEVICE — SOL NACL 0.9% IRRIG 3000ML BAG 2B7127

## (undated) DEVICE — LASER FIBER HOLMIUM MOSES 200 D/F/L AC-10030100

## (undated) DEVICE — SU SILK 2-0 FS-1 18" 685G

## (undated) DEVICE — ENDO SEAL BX PORT BPS-A

## (undated) DEVICE — CONTAINER URINE SPEC 4OZ STRL 1053

## (undated) DEVICE — Device

## (undated) DEVICE — BASKET STONE RETRIEVAL NTNL ZERO TIP 1.9FRX90CM M0063901050

## (undated) DEVICE — TUBING SUCTION MEDI-VAC 1/4"X20' N620A

## (undated) DEVICE — TUBING SET THERMEDX UROLOGY SGL USE LL0006

## (undated) DEVICE — SPONGE RAY-TEC 4X8" 7318

## (undated) DEVICE — CATH URETERAL OPEN END 5FRX70CM M0064002010

## (undated) DEVICE — SHEATH URETERAL ACCESS NAVIGATOR HD 11/13FRX36CM M0062502220

## (undated) DEVICE — SUCTION MANIFOLD NEPTUNE 2 SYS 1 PORT 702-025-000

## (undated) DEVICE — GUIDEWIRE SENSOR DUAL FLEX ANG 0.035"X150CM M0066703010

## (undated) DEVICE — KIT ENDO FIRST STEP DISINFECTANT 200ML W/POUCH EP-4

## (undated) DEVICE — SOL WATER IRRIG 1000ML BOTTLE 2F7114

## (undated) DEVICE — KIT TURNOVER FAIRVIEW SOUTHDALE HALF SP3890

## (undated) DEVICE — DEVICE MULTI TORQUE TD01

## (undated) DEVICE — MAT FLOOR WATERPROOF BACKSHEET FMBP30

## (undated) DEVICE — GLOVE BIOGEL PI ULTRATOUCH G SZ 7.5 42175

## (undated) DEVICE — PREP DYNA-HEX 4% CHG SCRUB 4OZ BOTTLE MDS098710

## (undated) RX ORDER — PHENYLEPHRINE HYDROCHLORIDE 10 MG/ML
INJECTION INTRAVENOUS
Status: DISPENSED
Start: 2025-06-24

## (undated) RX ORDER — EPHEDRINE SULFATE 50 MG/ML
INJECTION, SOLUTION INTRAMUSCULAR; INTRAVENOUS; SUBCUTANEOUS
Status: DISPENSED
Start: 2025-06-24

## (undated) RX ORDER — FENTANYL CITRATE 50 UG/ML
INJECTION, SOLUTION INTRAMUSCULAR; INTRAVENOUS
Status: DISPENSED
Start: 2025-06-24

## (undated) RX ORDER — PROPOFOL 10 MG/ML
INJECTION, EMULSION INTRAVENOUS
Status: DISPENSED
Start: 2025-06-24